# Patient Record
Sex: FEMALE | Race: WHITE | Employment: OTHER | URBAN - METROPOLITAN AREA
[De-identification: names, ages, dates, MRNs, and addresses within clinical notes are randomized per-mention and may not be internally consistent; named-entity substitution may affect disease eponyms.]

---

## 2017-02-22 ENCOUNTER — HOSPITAL ENCOUNTER (OUTPATIENT)
Dept: LAB | Age: 72
Discharge: HOME | End: 2017-02-22
Payer: MEDICARE

## 2017-02-22 PROCEDURE — 88305 TISSUE EXAM BY PATHOLOGIST: CPT | Performed by: OBSTETRICS & GYNECOLOGY

## 2018-02-19 NOTE — PATIENT DISCUSSION
CONJUNCTIVITIS (VIRAL), OU:  PRESCRIBE ZYLET OR TOBRADEX QID. RX EMYCIN LUKE QHS. RETURN FOR FOLLOW-UP AS SCHEDULED.

## 2018-02-19 NOTE — PATIENT DISCUSSION
New Prescription: TobraDex (tobramycin-dexamethasone): drops,suspension: 0.3-0.1% 1 drop four times a day into both eyes 02-

## 2018-02-19 NOTE — PATIENT DISCUSSION
New Prescription: erythromycin (erythromycin): ointment: 5 mg/gram (0.5 %) a small amount at bedtime into both eyes 02-

## 2018-02-26 NOTE — PATIENT DISCUSSION
Continue: TobraDex (tobramycin-dexamethasone): drops,suspension: 0.3-0.1% 1 drop four times a day into both eyes 02-

## 2018-02-26 NOTE — PATIENT DISCUSSION
Continue: erythromycin (erythromycin): ointment: 5 mg/gram (0.5 %) a small amount at bedtime into both eyes 02-

## 2018-02-26 NOTE — PATIENT DISCUSSION
CONJUNCTIVITIS (VIRAL), OU, IMPROVING: TAPER ZYLET OR TOBRADEX TID X 3 DAYS, BID X 2 DAYS, QD X 1 DAY THEN DISCONTINUE. RX EMYCIN LUKE QHS. RETURN FOR FOLLOW-UP AS SCHEDULED.

## 2019-06-26 NOTE — PATIENT DISCUSSION
Stopped Today: erythromycin (erythromycin): ointment: 5 mg/gram (0.5 %) a small amount at bedtime into both eyes 02-

## 2019-06-26 NOTE — PATIENT DISCUSSION
Stopped Today: TobraDex (tobramycin-dexamethasone): drops,suspension: 0.3-0.1% 1 drop four times a day into both eyes 02-

## 2019-07-09 ENCOUNTER — IMPORTED ENCOUNTER (OUTPATIENT)
Dept: URBAN - NONMETROPOLITAN AREA CLINIC 1 | Facility: CLINIC | Age: 74
End: 2019-07-09

## 2019-07-09 PROCEDURE — 92134 CPTRZ OPH DX IMG PST SGM RTA: CPT

## 2019-07-09 PROCEDURE — 99213 OFFICE O/P EST LOW 20 MIN: CPT

## 2019-07-09 NOTE — PATIENT DISCUSSION
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 -Pt to continue multivitamins -Pt is to contact our office if any changes in amsler are noted. Epiretinal membrane-Discussed findings of exam in detail with the patient.-Discussed the signs of worsening of the disease. 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 OSKAR-mild to moderate-Explained OSKAR and associated symptoms.-Recommend frequent Refresh brand gtts OU QID or more-Pt instructed on lid scrubs and warm compresses. -Pt will contact us if this does not provide relief.  RTC 1 YR CEE OCT MAC; 's Notes: OCT MAC 07/09/19

## 2019-07-10 PROBLEM — H18.51: Noted: 2019-07-10

## 2019-07-10 PROBLEM — H35.3131: Noted: 2019-07-10

## 2019-07-10 PROBLEM — H16.223: Noted: 2019-07-10

## 2019-07-10 PROBLEM — H35.372: Noted: 2019-07-10

## 2019-07-10 PROBLEM — H35.363: Noted: 2019-07-10

## 2019-07-17 NOTE — PATIENT DISCUSSION
PT EDUCATION ABOUT INCREASED MYOPIA LEADING TO DECREASED NEAR VISION DUE TO PRESBYOPIA. SHOWED PATIENT +0.50 OR. PATIENT DID NOT NOTICE SIGNIFICANT IMPROVEMENT AT NEAR. SHE WILL KEEP RX AS IS FOR NOW.

## 2019-08-07 ENCOUNTER — IMPORTED ENCOUNTER (OUTPATIENT)
Dept: URBAN - NONMETROPOLITAN AREA CLINIC 1 | Facility: CLINIC | Age: 74
End: 2019-08-07

## 2019-08-07 PROBLEM — H16.223: Noted: 2019-08-07

## 2019-08-07 PROBLEM — H18.51: Noted: 2019-08-07

## 2019-08-07 PROBLEM — H35.363: Noted: 2019-08-07

## 2019-08-07 PROBLEM — S05.02XA: Noted: 2019-08-07

## 2019-08-07 PROBLEM — H35.3131: Noted: 2019-08-07

## 2019-08-07 PROBLEM — H35.372: Noted: 2019-08-07

## 2019-08-07 PROCEDURE — 92012 INTRM OPH EXAM EST PATIENT: CPT

## 2019-08-07 NOTE — PATIENT DISCUSSION
Corneal abrasion from a plant (Day Lilly) LE.  2 gtts 1% cyclo instilled LE. Use vigamox q2h today then qid. Use polysporin ointment qam and qhs.RTC tomorrow.; 's Notes: [place] [facility] [place]

## 2019-08-08 ENCOUNTER — IMPORTED ENCOUNTER (OUTPATIENT)
Dept: URBAN - NONMETROPOLITAN AREA CLINIC 1 | Facility: CLINIC | Age: 74
End: 2019-08-08

## 2019-08-08 PROBLEM — H35.372: Noted: 2019-08-07

## 2019-08-08 PROBLEM — S05.02XD: Noted: 2019-08-08

## 2019-08-08 PROBLEM — H18.51: Noted: 2019-08-07

## 2019-08-08 PROBLEM — H35.3131: Noted: 2019-08-07

## 2019-08-08 PROBLEM — H16.223: Noted: 2019-08-07

## 2019-08-08 PROBLEM — H35.363: Noted: 2019-08-07

## 2019-08-08 PROCEDURE — 99213 OFFICE O/P EST LOW 20 MIN: CPT

## 2019-08-08 NOTE — PATIENT DISCUSSION
Corneal abrasion from a plant (Day Ailin) LE. Much better. Continue vigamox qid and Polysporin ointment qam and qhs LE x 6 more days. Then frequent AT for several months. RTC as scheduled or prn.; 's Notes: OCT MAC 07/09/19

## 2020-03-11 ENCOUNTER — IMPORTED ENCOUNTER (OUTPATIENT)
Dept: URBAN - NONMETROPOLITAN AREA CLINIC 1 | Facility: CLINIC | Age: 75
End: 2020-03-11

## 2020-03-11 PROBLEM — H18.51: Noted: 2020-03-11

## 2020-03-11 PROBLEM — H35.372: Noted: 2020-03-11

## 2020-03-11 PROBLEM — H35.3131: Noted: 2020-03-11

## 2020-03-11 PROBLEM — H35.363: Noted: 2020-03-11

## 2020-03-11 PROBLEM — H16.223: Noted: 2020-03-11

## 2020-03-11 PROCEDURE — 92134 CPTRZ OPH DX IMG PST SGM RTA: CPT

## 2020-03-11 PROCEDURE — 99213 OFFICE O/P EST LOW 20 MIN: CPT

## 2020-03-11 NOTE — PATIENT DISCUSSION
AMD/Drusen OU -Explained dry AMD and advised that there are no treatments available at this time.-Continue AREDS 2 MVT. -Continue Amsler grid monitoring daily. Pt is to contact our office if any changes are noted. Epiretinal membrane OS-Discussed findings of exam in detail with the patient.-Discussed the signs of worsening of the disease. -OCT MAC ordered performed and reviewed w/pt -Order OCT MAC; Dr's Notes: OCT MAC 03/11/20

## 2021-06-25 ENCOUNTER — IMPORTED ENCOUNTER (OUTPATIENT)
Dept: URBAN - NONMETROPOLITAN AREA CLINIC 1 | Facility: CLINIC | Age: 76
End: 2021-06-25

## 2021-06-25 PROBLEM — D31.31: Noted: 2021-06-25

## 2021-06-25 PROBLEM — H02.132: Noted: 2021-06-25

## 2021-06-25 PROBLEM — Z96.1: Noted: 2021-12-20

## 2021-06-25 PROBLEM — H43.813: Noted: 2021-12-20

## 2021-06-25 PROBLEM — H35.3131: Noted: 2020-03-11

## 2021-06-25 PROBLEM — D31.31: Noted: 2021-12-20

## 2021-06-25 PROBLEM — H02.132: Noted: 2021-12-20

## 2021-06-25 PROBLEM — H35.372: Noted: 2021-06-25

## 2021-06-25 PROBLEM — H43.813: Noted: 2021-06-25

## 2021-06-25 PROBLEM — H02.135: Noted: 2021-06-25

## 2021-06-25 PROBLEM — Z96.1: Noted: 2021-06-25

## 2021-06-25 PROBLEM — H02.135: Noted: 2021-12-20

## 2021-06-25 PROCEDURE — 92014 COMPRE OPH EXAM EST PT 1/>: CPT

## 2021-06-25 PROCEDURE — 92134 CPTRZ OPH DX IMG PST SGM RTA: CPT

## 2021-06-25 NOTE — PATIENT DISCUSSION
AMD - dry OU/ERM OS -Explained dry AMD and advised that there are currently no treatments available. -Recommend pt begin preservision AREDS 2 MVT and monitoring Amsler grid.-Amsler grid given and explained to pt. Recommend monitoring daily. Pt is to contact our office if any changes are noted. -OCT MAC performed and reviewed w/pt stable Choroidal nevusDiscussed findings of exam in detail with the patient. Ectropion-Ectropion (the lower eyelid rolling outward causing lashes to rub against the eye) was explained to the patient. .-This can result in excessive tearing irritation infection scarring and loss of vision.-Treatment options include observation or surgical correction. PVD-Retina flat 360 with no breaks tears or heme.-S&S of RD/RT reviewed with pt.-Stressed that pt should contact our office right away with any changes or increase in symptoms.; 's Notes: OCT MAC 03/11/20

## 2021-12-20 ENCOUNTER — IMPORTED ENCOUNTER (OUTPATIENT)
Dept: URBAN - NONMETROPOLITAN AREA CLINIC 1 | Facility: CLINIC | Age: 76
End: 2021-12-20

## 2021-12-20 PROBLEM — D31.31: Noted: 2021-12-20

## 2021-12-20 PROBLEM — H35.372: Noted: 2021-06-25

## 2021-12-20 PROBLEM — H35.3131: Noted: 2020-03-11

## 2021-12-20 PROBLEM — H43.813: Noted: 2021-12-20

## 2021-12-20 PROBLEM — Z96.1: Noted: 2021-12-20

## 2021-12-20 PROCEDURE — 92014 COMPRE OPH EXAM EST PT 1/>: CPT

## 2021-12-20 PROCEDURE — 92134 CPTRZ OPH DX IMG PST SGM RTA: CPT

## 2021-12-20 NOTE — PATIENT DISCUSSION
AMD - mild dry OU/ERM OS -Explained dry AMD and advised that there are currently no treatments available. -Recommend pt continuing preservision AREDS 2 MVT and monitoring Amsler grid.-Amsler grid given and explained to pt. Recommend monitoring daily. Pt is to contact our office if any changes are noted. -OCT MAC performed and reviewed w/pt stable Choroidal nevusDiscussed findings of exam in detail with the patient. Ectropion-Ectropion (the lower eyelid rolling outward causing lashes to rub against the eye) was explained to the patient. .-This can result in excessive tearing irritation infection scarring and loss of vision.-Treatment options include observation or surgical correction. PVD-Retina flat 360 with no breaks tears or heme.-S&S of RD/RT reviewed with pt.-Stressed that pt should contact our office right away with any changes or increase in symptoms. Ptosis OU-Discussed UPNEEQ/surgery correctionPresbyopia-Discussed VUITY will sample and patient will call back if interested-Discussed side effects including headache and dimming of light-Will mattie if interested in prescription; Ada Notes: OCT MAC 03/11/20

## 2022-04-16 ASSESSMENT — TONOMETRY
OS_IOP_MMHG: 13
OS_IOP_MMHG: 12
OD_IOP_MMHG: 12
OS_IOP_MMHG: 13
OD_IOP_MMHG: 12
OD_IOP_MMHG: 9
OS_IOP_MMHG: 12
OD_IOP_MMHG: 15

## 2022-04-16 ASSESSMENT — VISUAL ACUITY
OD_CC: 20/25
OD_CC: 20/25+1
OS_CC: 20/30+4
OS_CC: 20/25
OS_CC: 20/50
OS_CC: 20/50
OD_CC: 20/25-
OS_PH: 20/20
OS_CC: 20/30
OD_CC: 20/20
OD_CC: 20/20
OS_CC: 20/30+

## 2022-07-28 ENCOUNTER — ESTABLISHED PATIENT (OUTPATIENT)
Dept: RURAL CLINIC 2 | Facility: CLINIC | Age: 77
End: 2022-07-28

## 2022-07-28 DIAGNOSIS — D31.31: ICD-10-CM

## 2022-07-28 DIAGNOSIS — Z96.1: ICD-10-CM

## 2022-07-28 DIAGNOSIS — H43.813: ICD-10-CM

## 2022-07-28 DIAGNOSIS — H16.143: ICD-10-CM

## 2022-07-28 DIAGNOSIS — H35.3131: ICD-10-CM

## 2022-07-28 DIAGNOSIS — H02.125: ICD-10-CM

## 2022-07-28 DIAGNOSIS — H02.122: ICD-10-CM

## 2022-07-28 DIAGNOSIS — H35.372: ICD-10-CM

## 2022-07-28 PROCEDURE — 99214 OFFICE O/P EST MOD 30 MIN: CPT

## 2022-07-28 PROCEDURE — 92134 CPTRZ OPH DX IMG PST SGM RTA: CPT

## 2022-07-28 ASSESSMENT — TONOMETRY
OS_IOP_MMHG: 14
OD_IOP_MMHG: 13

## 2022-07-28 ASSESSMENT — VISUAL ACUITY
OS_SC: 20/30-1
OU_CC: 20/25
OD_SC: 20/30-1

## 2022-07-28 NOTE — PATIENT DISCUSSION
Importance of daily AREDS II study multivitamin discussed with patient. Patient to follow-up immediately with any new onset of decreased vision and/or metamorphopsia.

## 2023-08-25 ENCOUNTER — ESTABLISHED PATIENT (OUTPATIENT)
Dept: RURAL CLINIC 2 | Facility: CLINIC | Age: 78
End: 2023-08-25

## 2023-08-25 DIAGNOSIS — H35.3131: ICD-10-CM

## 2023-08-25 DIAGNOSIS — H02.125: ICD-10-CM

## 2023-08-25 DIAGNOSIS — D31.31: ICD-10-CM

## 2023-08-25 DIAGNOSIS — H52.13: ICD-10-CM

## 2023-08-25 DIAGNOSIS — H35.372: ICD-10-CM

## 2023-08-25 DIAGNOSIS — H52.223: ICD-10-CM

## 2023-08-25 DIAGNOSIS — H43.813: ICD-10-CM

## 2023-08-25 DIAGNOSIS — H02.122: ICD-10-CM

## 2023-08-25 DIAGNOSIS — H52.4: ICD-10-CM

## 2023-08-25 DIAGNOSIS — H16.143: ICD-10-CM

## 2023-08-25 DIAGNOSIS — Z96.1: ICD-10-CM

## 2023-08-25 PROCEDURE — 99214 OFFICE O/P EST MOD 30 MIN: CPT

## 2023-08-25 PROCEDURE — 92015 DETERMINE REFRACTIVE STATE: CPT

## 2023-08-25 PROCEDURE — 92134 CPTRZ OPH DX IMG PST SGM RTA: CPT

## 2023-08-25 ASSESSMENT — TONOMETRY
OD_IOP_MMHG: 15
OS_IOP_MMHG: 15

## 2023-08-25 ASSESSMENT — VISUAL ACUITY
OD_SC: 20/30
OS_SC: 20/50

## 2024-08-27 ENCOUNTER — COMPREHENSIVE EXAM (OUTPATIENT)
Dept: RURAL CLINIC 2 | Facility: CLINIC | Age: 79
End: 2024-08-27

## 2024-08-27 DIAGNOSIS — H02.125: ICD-10-CM

## 2024-08-27 DIAGNOSIS — H16.143: ICD-10-CM

## 2024-08-27 DIAGNOSIS — H52.13: ICD-10-CM

## 2024-08-27 DIAGNOSIS — Z96.1: ICD-10-CM

## 2024-08-27 DIAGNOSIS — H02.411: ICD-10-CM

## 2024-08-27 DIAGNOSIS — H52.4: ICD-10-CM

## 2024-08-27 DIAGNOSIS — D31.31: ICD-10-CM

## 2024-08-27 DIAGNOSIS — H35.372: ICD-10-CM

## 2024-08-27 DIAGNOSIS — H35.3131: ICD-10-CM

## 2024-08-27 DIAGNOSIS — H43.813: ICD-10-CM

## 2024-08-27 DIAGNOSIS — H02.122: ICD-10-CM

## 2024-08-27 PROCEDURE — 92015 DETERMINE REFRACTIVE STATE: CPT

## 2024-08-27 PROCEDURE — 92134 CPTRZ OPH DX IMG PST SGM RTA: CPT

## 2024-08-27 PROCEDURE — 92014 COMPRE OPH EXAM EST PT 1/>: CPT

## 2024-08-27 ASSESSMENT — TONOMETRY
OD_IOP_MMHG: 14
OS_IOP_MMHG: 14

## 2024-08-27 ASSESSMENT — VISUAL ACUITY
OD_CC: 20/50-1
OS_CC: 20/40-1

## 2025-08-26 ENCOUNTER — COMPREHENSIVE EXAM (OUTPATIENT)
Age: 80
End: 2025-08-26

## 2025-08-26 DIAGNOSIS — Z96.1: ICD-10-CM

## 2025-08-26 DIAGNOSIS — H35.372: ICD-10-CM

## 2025-08-26 DIAGNOSIS — D31.31: ICD-10-CM

## 2025-08-26 DIAGNOSIS — H35.3131: ICD-10-CM

## 2025-08-26 DIAGNOSIS — H43.813: ICD-10-CM

## 2025-08-26 DIAGNOSIS — H52.13: ICD-10-CM

## 2025-08-26 DIAGNOSIS — H52.4: ICD-10-CM

## 2025-08-26 PROCEDURE — 92134 CPTRZ OPH DX IMG PST SGM RTA: CPT

## 2025-08-26 PROCEDURE — 92014 COMPRE OPH EXAM EST PT 1/>: CPT

## 2025-08-26 PROCEDURE — 92015 DETERMINE REFRACTIVE STATE: CPT
